# Patient Record
(demographics unavailable — no encounter records)

---

## 2025-01-02 NOTE — HISTORY OF PRESENT ILLNESS
[Initial Pre-Travel Evaluation] : an initial pre-travel visit [The Country(ies) of ___] : the country([place] of [unfilled] [Travel Begins ___] : begins [unfilled] [Travel Duration ___] : will last [unfilled] [Travel Ends ___] : ends [unfilled] [Tourism] : tourism [Hotel] : hotel

## 2025-01-02 NOTE — ASSESSMENT
[FreeTextEntry1] : Extensive discussion regarding travel precautions including: Communicable diseases Mosquito borne illnesses Food and water contamination Travel Safety All risks and benefits discussed. zithromax only for tx travelers diarrhea